# Patient Record
Sex: FEMALE | Race: OTHER | Employment: OTHER | URBAN - NONMETROPOLITAN AREA
[De-identification: names, ages, dates, MRNs, and addresses within clinical notes are randomized per-mention and may not be internally consistent; named-entity substitution may affect disease eponyms.]

---

## 2017-09-22 ENCOUNTER — OFFICE VISIT (OUTPATIENT)
Dept: OPHTHALMOLOGY | Age: 77
End: 2017-09-22
Payer: MEDICARE

## 2017-09-22 DIAGNOSIS — H25.12 NUCLEAR SCLEROTIC CATARACT OF LEFT EYE: Primary | ICD-10-CM

## 2017-09-22 PROBLEM — H20.00 ACUTE IRITIS OF RIGHT EYE: Status: ACTIVE | Noted: 2017-09-22

## 2017-09-22 PROCEDURE — G8400 PT W/DXA NO RESULTS DOC: HCPCS

## 2017-09-22 PROCEDURE — 1123F ACP DISCUSS/DSCN MKR DOCD: CPT

## 2017-09-22 PROCEDURE — G8421 BMI NOT CALCULATED: HCPCS

## 2017-09-22 PROCEDURE — 1090F PRES/ABSN URINE INCON ASSESS: CPT

## 2017-09-22 PROCEDURE — 1036F TOBACCO NON-USER: CPT

## 2017-09-22 PROCEDURE — 99213 OFFICE O/P EST LOW 20 MIN: CPT

## 2017-09-22 PROCEDURE — 3017F COLORECTAL CA SCREEN DOC REV: CPT

## 2017-09-22 PROCEDURE — 4040F PNEUMOC VAC/ADMIN/RCVD: CPT

## 2017-09-22 PROCEDURE — G8427 DOCREV CUR MEDS BY ELIG CLIN: HCPCS

## 2017-09-22 RX ORDER — M-VIT,TX,IRON,MINS/CALC/FOLIC 27MG-0.4MG
1 TABLET ORAL DAILY
COMMUNITY

## 2017-09-22 RX ORDER — LATANOPROST 50 UG/ML
1 SOLUTION/ DROPS OPHTHALMIC NIGHTLY
COMMUNITY

## 2017-09-22 RX ORDER — OFLOXACIN 3 MG/ML
1 SOLUTION/ DROPS OPHTHALMIC 4 TIMES DAILY
COMMUNITY

## 2017-09-22 RX ORDER — DIFLUPREDNATE 0.5 MG/ML
1 EMULSION OPHTHALMIC 4 TIMES DAILY
COMMUNITY

## 2017-09-22 RX ORDER — TOBRAMYCIN 3 MG/ML
1 SOLUTION/ DROPS OPHTHALMIC EVERY 4 HOURS
COMMUNITY

## 2017-09-22 RX ORDER — BENOXINATE HCL/FLUORESCEIN SOD 0.4%-0.25%
1 DROPS OPHTHALMIC (EYE) ONCE
Status: COMPLETED | OUTPATIENT
Start: 2017-09-22 | End: 2017-09-22

## 2017-09-22 RX ADMIN — Medication 1 DROP: at 11:18

## 2017-09-22 ASSESSMENT — VISUAL ACUITY
METHOD: SNELLEN - LINEAR
OD_SC: 20/25-

## 2017-09-22 ASSESSMENT — TONOMETRY
OS_IOP_MMHG: 16
IOP_METHOD: TONOPEN
OD_IOP_MMHG: 15

## 2017-09-22 ASSESSMENT — SLIT LAMP EXAM - LIDS
COMMENTS: NORMAL
COMMENTS: NORMAL

## 2017-09-22 ASSESSMENT — CONF VISUAL FIELD
OD_NORMAL: 1
OS_NORMAL: 1

## 2020-03-10 LAB
AMORPHOUS SEDIMENT, URINE: NORMAL /HPF
APPEARANCE: NORMAL
BILIRUBIN, URINE: NEGATIVE
COLOR, URINE: YELLOW
EPITHELIAL CELLS, UA: NORMAL /LPF
GLUCOSE URINE: NEGATIVE G/DL
KETONES, URINE: NEGATIVE MG/DL
LEUKOCYTE ESTERASE, URINE: NEGATIVE
NITRITE, URINE: NEGATIVE
OCCULT BLOOD,URINE: NEGATIVE
PH, URINE: 6 (ref 5.5–8)
PROTEIN UA: NEGATIVE MG/DL
SPECIFIC GRAVITY, URINE: 1.02 (ref 1–1.03)
URINE CULTURE, ROUTINE: NORMAL
UROBILINOGEN, URINE: 0.2 EU/DL (ref 0.1–1)

## 2020-03-26 ENCOUNTER — OFFICE VISIT (OUTPATIENT)
Dept: CARDIOLOGY CLINIC | Age: 80
End: 2020-03-26
Payer: MEDICARE

## 2020-03-26 VITALS
DIASTOLIC BLOOD PRESSURE: 70 MMHG | WEIGHT: 140 LBS | HEIGHT: 61 IN | HEART RATE: 64 BPM | BODY MASS INDEX: 26.43 KG/M2 | SYSTOLIC BLOOD PRESSURE: 130 MMHG

## 2020-03-26 PROCEDURE — G8484 FLU IMMUNIZE NO ADMIN: HCPCS | Performed by: INTERNAL MEDICINE

## 2020-03-26 PROCEDURE — G8419 CALC BMI OUT NRM PARAM NOF/U: HCPCS | Performed by: INTERNAL MEDICINE

## 2020-03-26 PROCEDURE — 4040F PNEUMOC VAC/ADMIN/RCVD: CPT | Performed by: INTERNAL MEDICINE

## 2020-03-26 PROCEDURE — G8427 DOCREV CUR MEDS BY ELIG CLIN: HCPCS | Performed by: INTERNAL MEDICINE

## 2020-03-26 PROCEDURE — G8400 PT W/DXA NO RESULTS DOC: HCPCS | Performed by: INTERNAL MEDICINE

## 2020-03-26 PROCEDURE — 1123F ACP DISCUSS/DSCN MKR DOCD: CPT | Performed by: INTERNAL MEDICINE

## 2020-03-26 PROCEDURE — 1036F TOBACCO NON-USER: CPT | Performed by: INTERNAL MEDICINE

## 2020-03-26 PROCEDURE — 99214 OFFICE O/P EST MOD 30 MIN: CPT | Performed by: INTERNAL MEDICINE

## 2020-03-26 PROCEDURE — 1090F PRES/ABSN URINE INCON ASSESS: CPT | Performed by: INTERNAL MEDICINE

## 2020-03-26 RX ORDER — METOPROLOL SUCCINATE 25 MG/1
1 TABLET, EXTENDED RELEASE ORAL DAILY
COMMUNITY
Start: 2020-03-19

## 2020-03-26 RX ORDER — DOCUSATE SODIUM 100 MG/1
CAPSULE, LIQUID FILLED ORAL PRN
COMMUNITY
Start: 2020-02-03

## 2020-03-26 RX ORDER — MECLIZINE HCL 12.5 MG/1
TABLET ORAL PRN
COMMUNITY
Start: 2019-08-09

## 2020-03-26 RX ORDER — ATORVASTATIN CALCIUM 10 MG/1
1 TABLET, FILM COATED ORAL
COMMUNITY
Start: 2018-06-26

## 2020-03-26 NOTE — PROGRESS NOTES
Today's Date: 3/26/2020  Patient Name: Cassandra Lora  Patient's age: 67 y. o., 1940      Referred by: Ramu Wilder MD      CC: the patient is a 67 y.o.  female is in the office for palpitations. She was admitted for observation earlier this month with palpitation and tachycardia that happened while she was asleep. No arrhythmia was seen during her hospital stay. And echo showed normal LV systolic function. She was started on Toprol and has not had any episodes since. She denies angina, syncope and she is quite active. She had no prior history of CAD/PAF. Past Medical History:   has a past medical history of Arthritis, Cataract, Hypertension, Ocular disease, Open-angle glaucoma, and Presbyopia OU. Past Surgical History:   has a past surgical history that includes Cataract removal (Right). Home Medications:    Prior to Admission medications    Medication Sig Start Date End Date Taking? Authorizing Provider   difluprednate (DUREZOL) 0.05 % EMUL Place 1 drop into the left eye 4 times daily   Yes Historical Provider, MD   latanoprost (XALATAN) 0.005 % ophthalmic solution 1 drop nightly   Yes Historical Provider, MD   ofloxacin (OCUFLOX) 0.3 % solution Place 1 drop into the right eye 4 times daily   Yes Historical Provider, MD   tobramycin (TOBREX) 0.3 % ophthalmic solution 1 drop every 4 hours   Yes Historical Provider, MD   calcium carbonate (CALTRATE 600) 1500 (600 Ca) MG TABS tablet Take 600 mg by mouth daily   Yes Historical Provider, MD   Multiple Vitamins-Minerals (THERAPEUTIC MULTIVITAMIN-MINERALS) tablet Take 1 tablet by mouth daily   Yes Historical Provider, MD       Allergies:  Codeine    Social History:   reports that she has never smoked. She does not have any smokeless tobacco history on file. She reports current alcohol use.      Family History:    Family History   Problem Relation Age of Onset    Diabetes Sister     Glaucoma Sister    [facility]

## 2020-07-09 LAB — SURGICAL PATHOLOGY REPORT: NORMAL
